# Patient Record
Sex: FEMALE | URBAN - NONMETROPOLITAN AREA
[De-identification: names, ages, dates, MRNs, and addresses within clinical notes are randomized per-mention and may not be internally consistent; named-entity substitution may affect disease eponyms.]

---

## 2022-02-10 ENCOUNTER — OFFICE VISIT (OUTPATIENT)
Dept: URBAN - NONMETROPOLITAN AREA SURGERY CENTER 1 | Facility: SURGERY CENTER | Age: 52
End: 2022-02-10

## 2022-02-28 PROBLEM — 275978004 COLON CANCER SCREENING: Status: ACTIVE | Noted: 2022-02-28

## 2022-03-15 ENCOUNTER — OFFICE VISIT (OUTPATIENT)
Dept: URBAN - NONMETROPOLITAN AREA SURGERY CENTER 1 | Facility: SURGERY CENTER | Age: 52
End: 2022-03-15
Payer: COMMERCIAL

## 2022-03-15 DIAGNOSIS — Z12.11 AVERAGE RISK FOR CRC. DUE TO PT'S CO-MORBID STATE WITH END STAGE DEMENTIA, HIGH RISK FOR ANESTHESIA (PER NEUROLOGY); INABILITY TO TAKE A BOWEL PREP....WOULD NOT ADVISE ANY COLORECTAL CANCER SCREENING INCLUDING STOOL TEST FOR FECAL BLOOD.: ICD-10-CM

## 2022-03-15 PROCEDURE — 45378 DIAGNOSTIC COLONOSCOPY: CPT | Performed by: INTERNAL MEDICINE

## 2022-03-15 PROCEDURE — G8907 PT DOC NO EVENTS ON DISCHARG: HCPCS | Performed by: INTERNAL MEDICINE

## 2022-09-23 ENCOUNTER — OFFICE VISIT (OUTPATIENT)
Dept: URBAN - NONMETROPOLITAN AREA CLINIC 2 | Facility: CLINIC | Age: 52
End: 2022-09-23

## 2023-07-28 ENCOUNTER — TELEPHONE ENCOUNTER (OUTPATIENT)
Dept: URBAN - METROPOLITAN AREA CLINIC 23 | Facility: CLINIC | Age: 53
End: 2023-07-28

## 2024-11-14 ENCOUNTER — DASHBOARD ENCOUNTERS (OUTPATIENT)
Age: 54
End: 2024-11-14

## 2024-11-14 ENCOUNTER — OFFICE VISIT (OUTPATIENT)
Dept: URBAN - NONMETROPOLITAN AREA CLINIC 13 | Facility: CLINIC | Age: 54
End: 2024-11-14
Payer: COMMERCIAL

## 2024-11-14 ENCOUNTER — TELEPHONE ENCOUNTER (OUTPATIENT)
Dept: URBAN - METROPOLITAN AREA CLINIC 5 | Facility: CLINIC | Age: 54
End: 2024-11-14

## 2024-11-14 VITALS
SYSTOLIC BLOOD PRESSURE: 113 MMHG | DIASTOLIC BLOOD PRESSURE: 71 MMHG | BODY MASS INDEX: 30.47 KG/M2 | WEIGHT: 165.6 LBS | HEIGHT: 62 IN | HEART RATE: 76 BPM

## 2024-11-14 DIAGNOSIS — K57.30 SIGMOID DIVERTICULOSIS: ICD-10-CM

## 2024-11-14 DIAGNOSIS — Z12.11 COLON CANCER SCREENING: ICD-10-CM

## 2024-11-14 DIAGNOSIS — K64.8 INTERNAL HEMORRHOIDS: ICD-10-CM

## 2024-11-14 DIAGNOSIS — A04.72 C. DIFFICILE COLITIS: ICD-10-CM

## 2024-11-14 PROBLEM — 429430001: Status: ACTIVE | Noted: 2024-11-14

## 2024-11-14 PROCEDURE — 99214 OFFICE O/P EST MOD 30 MIN: CPT | Performed by: NURSE PRACTITIONER

## 2024-11-14 RX ORDER — ROSUVASTATIN CALCIUM 10 MG/1
TABLET, FILM COATED ORAL
Qty: 90 TABLET | Status: ACTIVE | COMMUNITY

## 2024-11-14 RX ORDER — MONTELUKAST SODIUM 10 MG/1
TABLET, FILM COATED ORAL
Qty: 90 TABLET | Status: ACTIVE | COMMUNITY

## 2024-11-14 RX ORDER — DROSPIRENONE 4 MG/1
TABLET, FILM COATED ORAL
Qty: 84 TABLET | Status: ACTIVE | COMMUNITY

## 2024-11-14 RX ORDER — DICYCLOMINE HYDROCHLORIDE 10 MG/1
1 CAPSULE 30 MINUTES BEFORE EATING CAPSULE ORAL
Qty: 60 | Refills: 11 | OUTPATIENT
Start: 2024-11-14 | End: 2025-11-09

## 2024-11-14 RX ORDER — VANCOMYCIN HYDROCHLORIDE 125 MG/1
TAKE 1 CAPSULE BY MOUTH 4 TIMES DAILY FOR 10 DAYS CAPSULE ORAL
Qty: 40 EACH | Refills: 0 | Status: ACTIVE | COMMUNITY

## 2024-11-14 RX ORDER — AMLODIPINE AND OLMESARTAN MEDOXOMIL 5; 20 MG/1; MG/1
TAKE 1 TABLET BY MOUTH ONCE DAILY TABLET, FILM COATED ORAL
Qty: 90 EACH | Refills: 2 | Status: ACTIVE | COMMUNITY

## 2024-11-14 RX ORDER — OMEPRAZOLE 20 MG/1
CAPSULE, DELAYED RELEASE ORAL
Qty: 14 CAPSULE | Status: ACTIVE | COMMUNITY

## 2024-11-14 NOTE — HPI-TODAY'S VISIT:
Ms. Angeles Fox is a 54-year-old female who  presents today for follow-up of abdominal pain and diarrhea.  This began around 6 weeks ago but she could not get an appointment with our office as she subsequently saw her primary care provider where stool studies were obtained showing positive seated toxin.  At that time she was having diarrhea 7-8 times per day and pain along her transverse colon and epigastrium.  She was treated with vancomycin and is on her last day of medication.  She is now passing 3 stools per day and her abdominal cramping has improved.  Last colonoscopy was March 2022 where internal hemorrhoids and diverticulosis were noted with recommendation to repeat in 10 years.  LG.

## 2025-02-20 ENCOUNTER — OFFICE VISIT (OUTPATIENT)
Dept: URBAN - NONMETROPOLITAN AREA CLINIC 13 | Facility: CLINIC | Age: 55
End: 2025-02-20
Payer: COMMERCIAL

## 2025-02-20 VITALS
HEART RATE: 87 BPM | SYSTOLIC BLOOD PRESSURE: 119 MMHG | BODY MASS INDEX: 29.08 KG/M2 | DIASTOLIC BLOOD PRESSURE: 80 MMHG | WEIGHT: 158 LBS | HEIGHT: 62 IN

## 2025-02-20 DIAGNOSIS — K64.8 INTERNAL HEMORRHOIDS: ICD-10-CM

## 2025-02-20 DIAGNOSIS — A04.72 C. DIFFICILE COLITIS: ICD-10-CM

## 2025-02-20 DIAGNOSIS — Z12.11 COLON CANCER SCREENING: ICD-10-CM

## 2025-02-20 DIAGNOSIS — K57.30 SIGMOID DIVERTICULOSIS: ICD-10-CM

## 2025-02-20 PROCEDURE — 99214 OFFICE O/P EST MOD 30 MIN: CPT | Performed by: NURSE PRACTITIONER

## 2025-02-20 RX ORDER — VANCOMYCIN HYDROCHLORIDE 125 MG/1
TAKE 1 CAPSULE BY MOUTH 4 TIMES DAILY FOR 10 DAYS CAPSULE ORAL
Qty: 40 EACH | Refills: 0 | Status: ON HOLD | COMMUNITY

## 2025-02-20 RX ORDER — DROSPIRENONE 4 MG/1
TABLET, FILM COATED ORAL
Qty: 84 TABLET | Status: ACTIVE | COMMUNITY

## 2025-02-20 RX ORDER — ROSUVASTATIN CALCIUM 10 MG/1
TABLET, FILM COATED ORAL
Qty: 90 TABLET | Status: ACTIVE | COMMUNITY

## 2025-02-20 RX ORDER — MONTELUKAST SODIUM 10 MG/1
TABLET, FILM COATED ORAL
Qty: 90 TABLET | Status: ACTIVE | COMMUNITY

## 2025-02-20 RX ORDER — DICYCLOMINE HYDROCHLORIDE 10 MG/1
1 CAPSULE 30 MINUTES BEFORE EATING CAPSULE ORAL
Qty: 60 | Refills: 11 | OUTPATIENT

## 2025-02-20 RX ORDER — OMEPRAZOLE 20 MG/1
CAPSULE, DELAYED RELEASE ORAL
Qty: 14 CAPSULE | Status: ACTIVE | COMMUNITY

## 2025-02-20 RX ORDER — DICYCLOMINE HYDROCHLORIDE 10 MG/1
1 CAPSULE 30 MINUTES BEFORE EATING CAPSULE ORAL
Qty: 60 | Refills: 11 | Status: ACTIVE | COMMUNITY
Start: 2024-11-14 | End: 2025-11-09

## 2025-02-20 RX ORDER — AMLODIPINE AND OLMESARTAN MEDOXOMIL 5; 20 MG/1; MG/1
TAKE 1 TABLET BY MOUTH ONCE DAILY TABLET, FILM COATED ORAL
Qty: 90 EACH | Refills: 2 | Status: ACTIVE | COMMUNITY

## 2025-02-20 NOTE — HPI-OTHER HISTORIES
11/2024: Ms. Angeles Fox is a 54-year-old female who presents today for follow-up of abdominal pain and diarrhea. This began around 6 weeks ago but she could not get an appointment with our office as she subsequently saw her primary care provider where stool studies were obtained showing positive seated toxin. At that time she was having diarrhea 7-8 times per day and pain along her transverse colon and epigastrium. She was treated with vancomycin and is on her last day of medication. She is now passing 3 stools per day and her abdominal cramping has improved. Last colonoscopy was March 2022 where internal hemorrhoids and diverticulosis were noted with recommendation to repeat in 10 years. LG.

## 2025-02-20 NOTE — HPI-TODAY'S VISIT:
Ms. Angeles Fox is a 54-year-old female who presents today for follow-up of colitis.  Today she reports doing very well.  She is rarely using dicyclomine and denies any discomfort.  Has not had recurrent diarrhea.  No bleeding. Overall feels well.  LG.